# Patient Record
Sex: FEMALE | Race: BLACK OR AFRICAN AMERICAN | NOT HISPANIC OR LATINO | URBAN - METROPOLITAN AREA
[De-identification: names, ages, dates, MRNs, and addresses within clinical notes are randomized per-mention and may not be internally consistent; named-entity substitution may affect disease eponyms.]

---

## 2020-09-18 ENCOUNTER — EMERGENCY (EMERGENCY)
Facility: HOSPITAL | Age: 30
LOS: 1 days | Discharge: HOME | End: 2020-09-18
Attending: EMERGENCY MEDICINE | Admitting: EMERGENCY MEDICINE
Payer: MEDICAID

## 2020-09-18 VITALS
TEMPERATURE: 98 F | DIASTOLIC BLOOD PRESSURE: 67 MMHG | SYSTOLIC BLOOD PRESSURE: 106 MMHG | RESPIRATION RATE: 15 BRPM | OXYGEN SATURATION: 100 % | HEART RATE: 92 BPM

## 2020-09-18 PROCEDURE — 99283 EMERGENCY DEPT VISIT LOW MDM: CPT

## 2020-09-18 RX ORDER — POLYETHYLENE GLYCOL 3350 17 G/17G
1 POWDER, FOR SOLUTION ORAL
Qty: 1 | Refills: 0
Start: 2020-09-18 | End: 2020-09-18

## 2020-09-18 RX ORDER — GLYCERIN ADULT
1 SUPPOSITORY, RECTAL RECTAL
Qty: 1 | Refills: 0
Start: 2020-09-18 | End: 2020-09-18

## 2020-09-18 RX ORDER — PSYLLIUM SEED (WITH DEXTROSE)
1 POWDER (GRAM) ORAL
Qty: 10 | Refills: 0
Start: 2020-09-18 | End: 2020-09-27

## 2020-09-18 NOTE — ED PROVIDER NOTE - CLINICAL SUMMARY MEDICAL DECISION MAKING FREE TEXT BOX
28 Y/O F w/ HX of gastric sleeve presents to ER for abdominal pain. Hepatitis panel. GI referral 30 Y/O F w/ HX of gastric sleeve presents to ER for abdominal pain. Non-tender on exam. No acute findings. Will provide RX medications and GI referral 30 Y/O F w/ HX of gastric sleeve presents to ER for abdominal pain. Non-tender on exam. No acute findings. Will provide RX medications and GI referral    Vick: pt is s/p 170 lb weight loss s/p gastric sleeve.  here b/c seen at [facility], had ct for abd pain, told had large liver, constipation and "? introsusception??  here abd is completely soft, pt denies v/d/icterus, sclera non icteric.  advised to return for abd pain, especially bc of sleeve/ intrasusseption, but does not need studies done here now.  can be referred to outpt GI.  pt agrees

## 2020-09-18 NOTE — ED PROVIDER NOTE - PATIENT PORTAL LINK FT
You can access the FollowMyHealth Patient Portal offered by [facility] by registering at the following website: [email or web address]. By joining [employer]’s FollowMyHealth portal, you will also be able to view your health information using other applications (apps) compatible with our system.

## 2020-09-18 NOTE — ED PROVIDER NOTE - PHYSICAL EXAMINATION
Vital signs reviewed.   CONSTITUTIONAL: Well-appearing; well-nourished; in no apparent distress. Non-toxic appearing.   HEAD: Normocephalic, atraumatic.  EYES: PERRL, EOM intact, conjunctiva and no sclera injection noted  ENT: normal nose; no rhinorrhea  NECK/LYMPH: Supple; non-tender  CARD: Normal S1, S2  RESP: Normal chest excursion with respiration; breath sounds clear and equal bilaterally; no wheezes, rhonchi, or rales.  ABD/GI: soft, non-distended; non-tender; no CVA tenderness  EXT/MS: moves all extremities; distal pulses are normal, no pedal edema.  SKIN: Normal for age and race; warm; dry; good turgor; no apparent lesions or exudate noted.  NEURO: Awake, alert, oriented x 3, no gross deficits, CN II-XII grossly intact, no motor or sensory deficit noted.  PSYCH: Normal mood; appropriate affect.

## 2020-09-18 NOTE — ED PROVIDER NOTE - ATTENDING CONTRIBUTION TO CARE
Vick: pt is s/p 170 lb weight loss s/p gastric sleeve.  here b/c seen at [facility], had ct for abd pain, told had large liver, constipation and "? introsusception??  here abd is completely soft, pt denies v/d/icterus, sclera non icteric.  advised to return for abd pain, especially bc of sleeve/ intrasusseption, but does not need studies done here now.  can be referred to outpt GI.  pt agrees    I performed a history and physical exam of the patient and discussed their management with the resident and /or advanced care provider. I reviewed the resident and /or ACP's note and agree with the documented findings and plan of care. My medical decison making and observations are found above.

## 2020-09-18 NOTE — ED PROVIDER NOTE - NSFOLLOWUPCLINICS_GEN_ALL_ED_FT
[facility]  Gastroenterology  [address] [place] [id number]  Phone: [phone]  Fax:   Follow Up Time:

## 2020-09-18 NOTE — ED ADULT TRIAGE NOTE - CHIEF COMPLAINT QUOTE
Pt c/o abdominal pain with constipation for the past few months.  Was evaluated at [facility] and when she was reading her reports saw that she had an enlarged liver.  Denies any nausa/vomiting.  Denies any urinary symptoms.  Last BM was Wednesday but states she has been having to use laxatives.  Denies any PMHx.

## 2020-09-18 NOTE — ED PROVIDER NOTE - OBJECTIVE STATEMENT
28 Y/O F w/ HX of gastric sleeve presents to ER for abdominal pain. Has experienced strong cramping umbilical pain radiating to Rt flank off and on for the past few months. Episodes last a few minutes and resolve on their own. Evaluated at [facility] on 8/30, CT demonstrates large stool, intussusception and enlarged liver. Also admits to constipation, cannot have bowel movement with laxatives. Last bowel movement was 2 days ago. Denies fever, nausea/vomiting, chest pain, SOB, vaginal bleeding, dysuria, jaundice, hematochezia or melena

## 2020-09-18 NOTE — ED PROVIDER NOTE - NS ED ROS FT
Constitutional: (-) fever (-) vomiting  Head: Normal cephalic, Atraumatic  Eyes/ENT: (-) vision changes,   Cardiovascular: (-) chest pain, (-) wheezing  Respiratory: (-) cough, (-) shortness of breath  Gastrointestinal: (-) vomiting, (-) diarrhea, (+) abdominal pain  : (-) dysuria   Musculoskeletal: (-) back pain  Integumentary: (-) rash, (-) edema  Neurological: (-)loc  Allergic/Immunologic: (-) pruritus

## 2020-09-21 PROBLEM — Z00.00 ENCOUNTER FOR PREVENTIVE HEALTH EXAMINATION: Status: ACTIVE | Noted: 2020-09-21

## 2020-11-09 ENCOUNTER — APPOINTMENT (OUTPATIENT)
Dept: GASTROENTEROLOGY | Facility: CLINIC | Age: 30
End: 2020-11-09
Payer: MEDICAID

## 2020-11-09 VITALS
TEMPERATURE: 98.6 F | WEIGHT: 156 LBS | BODY MASS INDEX: 26.63 KG/M2 | SYSTOLIC BLOOD PRESSURE: 110 MMHG | OXYGEN SATURATION: 96 % | HEIGHT: 64 IN | HEART RATE: 63 BPM | RESPIRATION RATE: 16 BRPM | DIASTOLIC BLOOD PRESSURE: 76 MMHG

## 2020-11-09 DIAGNOSIS — R10.9 UNSPECIFIED ABDOMINAL PAIN: ICD-10-CM

## 2020-11-09 DIAGNOSIS — Z78.9 OTHER SPECIFIED HEALTH STATUS: ICD-10-CM

## 2020-11-09 DIAGNOSIS — R16.0 HEPATOMEGALY, NOT ELSEWHERE CLASSIFIED: ICD-10-CM

## 2020-11-09 DIAGNOSIS — K59.09 OTHER CONSTIPATION: ICD-10-CM

## 2020-11-09 DIAGNOSIS — Z82.49 FAMILY HISTORY OF ISCHEMIC HEART DISEASE AND OTHER DISEASES OF THE CIRCULATORY SYSTEM: ICD-10-CM

## 2020-11-09 PROCEDURE — 99072 ADDL SUPL MATRL&STAF TM PHE: CPT

## 2020-11-09 PROCEDURE — 99203 OFFICE O/P NEW LOW 30 MIN: CPT

## 2020-11-09 NOTE — ASSESSMENT
[FreeTextEntry1] : Impression:\par 1. Hepatomegaly - normal liver enzymes; less likely hepatitis; differential includes infiltrative disease, Budd Chair, vs over-read of CT\par 2. Abdominal pain - more likely related to transient intussusception vs constipation, as pain is now resolved; unlikely due to hepatomegaly given its transient nature\par 3. Chronic constipation \par \par Plan:\par -Check abdominal Doppler to evaluate size of liver again along with evaluation of the portal system and blood vessels\par -Do basic liver workup - check hepatitis serologies, metabolic liver disease, SPEP/UPEP, autoimmune labs, alpha 1 antitrypsin\par -Continue Miralax, fiber intake for constipation\par -If hepatomegaly/pain persists and workup is negative, consider referral to hepatology\par -Will obtain previous ultrasound of abdomen from patient's bariatric surgeon, HIPAA release form signed by patient

## 2020-11-09 NOTE — PHYSICAL EXAM
[General Appearance - Alert] : alert [General Appearance - In No Acute Distress] : in no acute distress [Sclera] : the sclera and conjunctiva were normal [Outer Ear] : the ears and nose were normal in appearance [Neck Appearance] : the appearance of the neck was normal [] : no respiratory distress [Edema] : there was no peripheral edema [Bowel Sounds] : normal bowel sounds [Abdomen Soft] : soft [Abdomen Tenderness] : non-tender [Abdomen Mass (___ Cm)] : no abdominal mass palpated [FreeTextEntry1] : ?mild hepatmoegaly, live edge not clearly felt; no splenomegaly [No Spinal Tenderness] : no spinal tenderness [Involuntary Movements] : no involuntary movements were seen [Skin Color & Pigmentation] : normal skin color and pigmentation [No Focal Deficits] : no focal deficits [Oriented To Time, Place, And Person] : oriented to person, place, and time [Affect] : the affect was normal [Mood] : the mood was normal

## 2020-11-09 NOTE — HISTORY OF PRESENT ILLNESS
[Heartburn] : denies heartburn [Nausea] : denies nausea [Vomiting] : denies vomiting [Diarrhea] : denies diarrhea [Constipation] : denies constipation [Yellow Skin Or Eyes (Jaundice)] : denies jaundice [Wt Loss ___ Lbs] : no recent weight loss [Abdominal Pain] : abdominal pain [_________] : Performed [unfilled] [de-identified] : This is a 29 year old female s/p gastric sleeve 2018, who presents for evaluation of hepatomegaly, abdominal pain.\par \par The patient was seen in the ER on 9/18/20 for abdominal pain. She had a prior admission to [facility] on 8/30 for ?large stool, intussusception and enlarged liver. The pain only occurred those two times when she was in the ER, and never before or since afterwards. The pain was severe like cramps, in the RUQ. She did not have any nausea/vomiting. She did not have any changes in bowel habits. Since her gastric sleeve, she has had chronic constipation, going every 2-3 days. From the last ER visit she was given MiraLAX and with it her bowel movements have normalized to formed, soft once a day. She denies any blood or melena in her stool. She lows >150 lbs since the gastric sleeve and it is now stabilized. She denies any trouble eating/swallowing. She has not had blood transfusions before, but she did have tattoos and piercing done at reputable shops.She had an EGD prior to the gastric sleeve, but has not had colonoscopy before. She drinks 6 bottles of water a day.\par \par 8/31/20:\par Na 139, K 4.2, Cl 102, Bicarb 28, BUN 8, Cr 0.92, Glucose 91, Ca 9.3\par WBC 7, Hgb 11.5, MCV 92.1, Hct 33.6, \par Albumin 4, T protein 6.4, T bili <0.3, ALP 45, ASt 16, ALT 18\par lipase 46 [de-identified] : Hepatomegaly measuring 19 cm. No gallstones, no ductal dilation. Normal pancreas. No splenomegaly, normal spleen. S/p gastric bypass. large volume of stool in the colon. Nonobstructing small bowel intussusception in the left abdomen.Correlate clinically as many times these are transient in nature.

## 2020-11-13 LAB
A1AT SERPL-MCNC: 124 MG/DL
ALBUMIN MFR SERPL ELPH: 57.6 %
ALBUMIN SERPL-MCNC: 4.1 G/DL
ALBUMIN/GLOB SERPL: 1.4 RATIO
ALBUPE: 19 %
ALPHA1 GLOB MFR SERPL ELPH: 3.8 %
ALPHA1 GLOB SERPL ELPH-MCNC: 0.3 G/DL
ALPHA1UPE: 30.1 %
ALPHA2 GLOB MFR SERPL ELPH: 9.7 %
ALPHA2 GLOB SERPL ELPH-MCNC: 0.7 G/DL
ALPHA2UPE: 23.4 %
ANA SER IF-ACNC: NEGATIVE
B-GLOBULIN MFR SERPL ELPH: 12.4 %
B-GLOBULIN SERPL ELPH-MCNC: 0.9 G/DL
BETAUPE: 16 %
CERULOPLASMIN SERPL-MCNC: 26 MG/DL
CREAT 24H UR-MCNC: NORMAL G/24 H
CREATININE UR (MAYO): 106 MG/DL
FERRITIN SERPL-MCNC: 27 NG/ML
GAMMA GLOB FLD ELPH-MCNC: 1.2 G/DL
GAMMA GLOB MFR SERPL ELPH: 16.5 %
GAMMAUPE: 11.5 %
HBV CORE IGG+IGM SER QL: NONREACTIVE
HBV SURFACE AB SERPL IA-ACNC: <3 MIU/ML
HBV SURFACE AG SER QL: NONREACTIVE
HCV AB SER QL: NONREACTIVE
HCV S/CO RATIO: 0.12 S/CO
IGA 24H UR QL IFE: NORMAL
INTERPRETATION SERPL IEP-IMP: NORMAL
IRON SATN MFR SERPL: 15 %
IRON SERPL-MCNC: 50 UG/DL
KAPPA LC 24H UR QL: NORMAL
MITOCHONDRIA AB SER IF-ACNC: NORMAL
PROT PATTERN 24H UR ELPH-IMP: NORMAL
PROT SERPL-MCNC: 7.1 G/DL
PROT SERPL-MCNC: 7.1 G/DL
PROT UR-MCNC: 8 MG/DL
PROT UR-MCNC: 8 MG/DL
SMOOTH MUSCLE AB SER QL IF: ABNORMAL
SPECIMEN VOL 24H UR: NORMAL ML
TIBC SERPL-MCNC: 341 UG/DL
TSH SERPL-ACNC: 1.39 UIU/ML
UIBC SERPL-MCNC: 291 UG/DL

## 2020-11-23 ENCOUNTER — NON-APPOINTMENT (OUTPATIENT)
Age: 30
End: 2020-11-23

## 2020-12-09 ENCOUNTER — NON-APPOINTMENT (OUTPATIENT)
Age: 30
End: 2020-12-09